# Patient Record
Sex: MALE | Race: WHITE
[De-identification: names, ages, dates, MRNs, and addresses within clinical notes are randomized per-mention and may not be internally consistent; named-entity substitution may affect disease eponyms.]

---

## 2020-02-22 ENCOUNTER — HOSPITAL ENCOUNTER (EMERGENCY)
Dept: HOSPITAL 41 - JD.ED | Age: 65
Discharge: HOME | End: 2020-02-22
Payer: COMMERCIAL

## 2020-02-22 DIAGNOSIS — S91.311A: Primary | ICD-10-CM
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
## 2020-02-22 DIAGNOSIS — I10: ICD-10-CM

## 2020-02-22 NOTE — EDM.PDOC
ED HPI GENERAL MEDICAL PROBLEM





- General


Chief Complaint: Lower Extremity Injury/Pain


Stated Complaint: KNIFE WOUND ON RT FOOT


Time Seen by Provider: 02/22/20 13:10


Source of Information: Reports: Patient, RN Notes Reviewed


History Limitations: Reports: No Limitations





- History of Present Illness


INITIAL COMMENTS - FREE TEXT/NARRATIVE: 





Patient is a 64-year-old male who presents to the ED for evaluation of a knife 

wound on the top of his right foot.  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 

house, when he dropped his new sheet rock knife, which is apparently fairly 

sharp.  And this fell onto his foot, then fell onto the floor.  He began to 

feel some slight pain in his foot looked down and saw that it was bleeding.  It 

was at this time that they took the shoe off, and wrapped the foot with some 

gauze.  They are not sure how big of a laceration it was, but states that it 

was spurting quite a bit.  Patient is not complaining of any numbness or 

tingling to his toes that was not there before, and he can wiggle his toes in 

all range of motion.  Patient believes that the blade was intact when it 

bounced out of his foot.  Patient believes that he is up-to-date on his tetanus 

vaccination.  The bandaging that they did on scene, did seem to stop the 

bleeding, and there is no active bleeding at time of initial exam.  Patient 

states he has a history of hypertension but does not believe he is on any sort 

of blood thinners.





- Related Data


 Allergies











Allergy/AdvReac Type Severity Reaction Status Date / Time


 


No Known Allergies Allergy   Verified 02/22/20 13:21














Past Medical History


Cardiovascular History: Reports: Hypertension





Review of Systems





- Review of Systems


Review Of Systems: Comprehensive ROS is negative, except as noted in HPI.


Respiratory: Denies: Shortness of Breath


Cardiovascular: Denies: Chest Pain


Skin: Reports: Wound (5 mm laceration to the top of the right foot.  Not 

actively bleeding.)


Neurological: Denies: Numbness, Tingling, Difficulty Walking





ED EXAM, GENERAL





- Physical Exam


Exam: See Below


Exam Limited By: No Limitations


General Appearance: Alert, WD/WN, No Apparent Distress


Respiratory/Chest: No Respiratory Distress, Lungs Clear, Normal Breath Sounds, 

No Accessory Muscle Use, Chest Non-Tender


Cardiovascular: Normal Peripheral Pulses, Regular Rate, Rhythm, No Murmur


Peripheral Pulses: 3+: Dorsalis Pedis (L), Dorsalis Pedis (R)


Extremities: Normal Inspection (with exception of laceration to dorsum of R foot

), Normal Range of Motion, No Pedal Edema, Normal Capillary Refill


Neurological: Alert, Oriented, Normal Cognition, No Motor/Sensory Deficits


Psychiatric: Normal Affect, Normal Mood


Skin Exam: Warm, Dry, Normal Color, No Rash, Wound/Incision (5 mm laceration to 

dorsum of right foot, not actively bleeding.)





ED TRAUMA EXTREMITY PROCEDURES





- Laceration/Wound Repair


  ** Right Dorsal Foot


Lac/Wound Length In cm: 0.5


Appearance: Superficial, Linear, Clean


Distal NVT: Neuro & Vascular Intact, No Tendon Injury


Skin Prep: Chlorhexidine (Hibiciens), Saline


Exploration/Debridement/Repair: Wound Explored, In a Bloodless Field, Explored 

to Base, No Foreign Material Found


Closed With: Dermabond


Sterile Dressing Applied: Nurse


Tetanus Status Addressed: Yes


Complications: No





Course





- Vital Signs


Last Recorded V/S: 


 Last Vital Signs











Temp  97.7 F   02/22/20 13:21


 


Pulse  73   02/22/20 13:21


 


Resp  17   02/22/20 13:21


 


BP  143/95 H  02/22/20 13:21


 


Pulse Ox  97   02/22/20 13:21














- Orders/Labs/Meds


Orders: 


 Active Orders 24 hr











 Category Date Time Status


 


 Foot Comp Min 3V Rt [CR] Stat Exams  02/22/20 13:22 Ordered














- Re-Assessments/Exams


Free Text/Narrative Re-Assessment/Exam: 





02/22/20 13:30


Patient presents to the ED for a knife wound to the top of his foot.  I have 

obtained a foot x-ray to make sure there is no retained foreign body.  The 

wound will likely be closed with Dermabond and have a pressure dressing applied 

after it is cleaned to help provide further hemostasis.  This will be left in 

place for around 24 hours before he can take it off again.





02/22/20 14:17


X-ray has been done, and demonstrates no foreign body retained in his foot at 

this time.  Patient will be discharged home with conservative recommendations, 

with strict instructions for follow-up.





Departure





- Departure


Time of Disposition: 13:31


Disposition: Home, Self-Care 01


Condition: Fair


Clinical Impression: 


Laceration of foot


Qualifiers:


 Encounter type: initial encounter Laterality: right Qualified Code(s): 

S91.311A - Laceration without foreign body, right foot, initial encounter








- Discharge Information


*PRESCRIPTION DRUG MONITORING PROGRAM REVIEWED*: No


*COPY OF PRESCRIPTION DRUG MONITORING REPORT IN PATIENT MEGAN: No


Instructions:  Laceration Care, Adult, Easy-to-Read


Referrals: 


PCP,Not In Area [Primary Care Provider] - 


Forms:  ED Department Discharge


Additional Instructions: 


You have been evaluated in the ED for your laceration.





Your wound was repaired with Dermabond, this will eventually rub itself off, 

but should provide pretty good wound management before this happens.  You did 

have a pressure dressing applied to this area, please keep this in place for 

the next 24 to 48 hours to help prevent further bleeding.  Recommend you 

elevate the leg as much as possible over the next 24 to 48 hours as well.





Please keep this area clean and dry, you may cleanse with regular soap and 

water. No vigorous scrubbing.





Watch out for signs of infection like increased redness, swelling, pain at the 

laceration site, or if you should develop any fevers or chills.





At the time of today's exam, there does not appear to be any tendon damage, or 

nerve damage.  If you should develop numbness/tingling, or loss of range of 

motion in any of your toes of your right foot, recommend you follow-up with 

orthopedics for re-evaluation and further management if warranted.  Our 

orthopedic surgeon, Dr. Silverio's number is [phone].





Please return to ED if your symptoms change or worsen.








Sepsis Event Note





- Focused Exam


Vital Signs: 


 Vital Signs











  Temp Pulse Resp BP Pulse Ox


 


 02/22/20 13:21  97.7 F  73  17  143/95 H  97











Date Exam was Performed: 02/22/20


Time Exam was Performed: 14:17





- My Orders


Last 24 Hours: 


My Active Orders





02/22/20 13:22


Foot Comp Min 3V Rt [CR] Stat 














- Assessment/Plan


Last 24 Hours: 


My Active Orders





02/22/20 13:22


Foot Comp Min 3V Rt [CR] Stat

## 2020-02-22 NOTE — CR
Right foot:  Four views of the right foot were obtained.

 

Comparison: No prior foot exam.

 

Soft tissue swelling is seen dorsally.  Joint spaces are preserved.  

No acute fracture, dislocation or other bony abnormality is seen.  

Small's plantar spur is noted.  Vascular calcification is present.

 

Impression:

1.  Findings as noted above.

2.  No acute bony abnormality is seen on right foot exam.

 

Diagnostic code #2

 

This report was dictated in [place] Standard Time